# Patient Record
Sex: MALE | URBAN - METROPOLITAN AREA
[De-identification: names, ages, dates, MRNs, and addresses within clinical notes are randomized per-mention and may not be internally consistent; named-entity substitution may affect disease eponyms.]

---

## 2023-07-06 ENCOUNTER — WEB ENCOUNTER (OUTPATIENT)
Dept: URBAN - METROPOLITAN AREA CLINIC 63 | Facility: CLINIC | Age: 73
End: 2023-07-06

## 2023-07-10 ENCOUNTER — OFFICE VISIT (OUTPATIENT)
Dept: URBAN - METROPOLITAN AREA CLINIC 63 | Facility: CLINIC | Age: 73
End: 2023-07-10
Payer: OTHER GOVERNMENT

## 2023-07-10 VITALS
HEIGHT: 68 IN | TEMPERATURE: 97 F | BODY MASS INDEX: 27.89 KG/M2 | DIASTOLIC BLOOD PRESSURE: 80 MMHG | WEIGHT: 184 LBS | OXYGEN SATURATION: 96 % | SYSTOLIC BLOOD PRESSURE: 120 MMHG

## 2023-07-10 DIAGNOSIS — R93.89 ABNORMAL FINDING ON IMAGING: ICD-10-CM

## 2023-07-10 DIAGNOSIS — R10.13 EPIGASTRIC PAIN: ICD-10-CM

## 2023-07-10 PROBLEM — 408574004: Status: ACTIVE | Noted: 2023-07-10

## 2023-07-10 PROCEDURE — 99203 OFFICE O/P NEW LOW 30 MIN: CPT | Performed by: NURSE PRACTITIONER

## 2023-07-10 RX ORDER — KETOCONAZOLE 20 MG/ML
AS DIRECTED SHAMPOO, SUSPENSION TOPICAL
Status: ACTIVE | COMMUNITY

## 2023-07-10 RX ORDER — OMEPRAZOLE 40 MG/1
1 CAPSULE 30 MINUTES BEFORE MORNING MEAL CAPSULE, DELAYED RELEASE ORAL ONCE A DAY
Qty: 90 | Refills: 0 | OUTPATIENT
Start: 2023-07-10

## 2023-07-10 RX ORDER — FOLIC ACID 1 MG/1
1 TABLET TABLET ORAL ONCE A DAY
Status: ACTIVE | COMMUNITY

## 2023-07-10 RX ORDER — FLUOCINOLONE ACETONIDE 0.11 MG/ML
1 APPLICATION OIL TOPICAL TWICE A DAY
Status: ACTIVE | COMMUNITY

## 2023-07-10 RX ORDER — LISINOPRIL 20 MG/1
1 TABLET TABLET ORAL ONCE A DAY
Status: ACTIVE | COMMUNITY

## 2023-07-10 NOTE — HPI-TODAY'S VISIT:
He is seen today for chronic epigastric pain attacks which he has had for 8 years. with varying opinions.  He states having gallstones seen with previous imaging. He states the attacks cause moderate to severe pain which only last for 2-3 minutes and then it will resolve. Pain can be LUQ, RUQ and epigastric. Last work up was completed in [facility] CT scan which showed gallstones without evidence of cholecystitis and thickening of distal esophagus with EGD recommended.   He relates that eating will cause worsening GERD but does not trigger the pain. Bending over to  golf ball or when getting into the golfcart. He states when pain is bad it can cause some dyspnea because it hurts to take a breath. He has no neck or arm pain. He states in the past two weeks he has had 5-6 such attacks. He had previous Cardiac work up which he reports was normal  a few years ago due to same symptoms. He reports he had colonoscopy 10-15 years ago. He completed cologuard test about a year ago and it was negative. He does not want to have a colonoscopy.

## 2023-07-10 NOTE — PHYSICAL EXAM GASTROINTESTINAL
Abdomen , soft, EPIGASTRIC TENDERNESS nondistended , no guarding or rigidity , no masses palpable , normal bowel sounds, soft, nontender, nondistended

## 2023-07-21 LAB
A/G RATIO: 2
ABSOLUTE BASOPHILS: 12
ABSOLUTE EOSINOPHILS: 87
ABSOLUTE LYMPHOCYTES: 1085
ABSOLUTE MONOCYTES: 564
ABSOLUTE NEUTROPHILS: 4452
ALBUMIN: 4.6
ALKALINE PHOSPHATASE: 54
ALT (SGPT): 15
AMYLASE: 29
AST (SGOT): 13
BASOPHILS: 0.2
BILIRUBIN, TOTAL: 0.5
BUN/CREATININE RATIO: (no result)
BUN: 23
CALCIUM: 9.6
CARBON DIOXIDE, TOTAL: 29
CHLORIDE: 106
CREATININE: 1.24
EGFR: 62
EOSINOPHILS: 1.4
GLOBULIN, TOTAL: 2.3
GLUCOSE: 90
HEMATOCRIT: 38
HEMOGLOBIN: 12.8
LIPASE: 25
LYMPHOCYTES: 17.5
MCH: 30.3
MCHC: 33.7
MCV: 89.8
MONOCYTES: 9.1
MPV: 10.8
NEUTROPHILS: 71.8
PLATELET COUNT: 137
POTASSIUM: 4.3
PROTEIN, TOTAL: 6.9
RDW: 13.5
RED BLOOD CELL COUNT: 4.23
SED RATE BY MODIFIED: 6
SODIUM: 142
WHITE BLOOD CELL COUNT: 6.2

## 2023-08-03 ENCOUNTER — TELEPHONE ENCOUNTER (OUTPATIENT)
Dept: URBAN - METROPOLITAN AREA CLINIC 7 | Facility: CLINIC | Age: 73
End: 2023-08-03

## 2023-08-09 ENCOUNTER — OUT OF OFFICE VISIT (OUTPATIENT)
Dept: URBAN - METROPOLITAN AREA SURGERY CENTER 4 | Facility: SURGERY CENTER | Age: 73
End: 2023-08-09
Payer: OTHER GOVERNMENT

## 2023-08-09 ENCOUNTER — CLAIMS CREATED FROM THE CLAIM WINDOW (OUTPATIENT)
Dept: URBAN - METROPOLITAN AREA CLINIC 61 | Facility: CLINIC | Age: 73
End: 2023-08-09
Payer: OTHER GOVERNMENT

## 2023-08-09 DIAGNOSIS — R12 HEARTBURN: ICD-10-CM

## 2023-08-09 DIAGNOSIS — K44.9 HIATAL HERNIA: ICD-10-CM

## 2023-08-09 DIAGNOSIS — K44.9 DIAPHRAGMATIC HERNIA: ICD-10-CM

## 2023-08-09 DIAGNOSIS — R93.3 ABN FINDINGS-GI TRACT: ICD-10-CM

## 2023-08-09 DIAGNOSIS — K29.70 GASTRITIS: ICD-10-CM

## 2023-08-09 PROCEDURE — 43239 EGD BIOPSY SINGLE/MULTIPLE: CPT | Performed by: INTERNAL MEDICINE

## 2023-08-09 PROCEDURE — 88312 SPECIAL STAINS GROUP 1: CPT | Performed by: INTERNAL MEDICINE

## 2023-08-09 PROCEDURE — 00731 ANES UPR GI NDSC PX NOS: CPT | Performed by: NURSE ANESTHETIST, CERTIFIED REGISTERED

## 2023-08-09 RX ORDER — FLUOCINOLONE ACETONIDE 0.11 MG/ML
1 APPLICATION OIL TOPICAL TWICE A DAY
Status: ACTIVE | COMMUNITY

## 2023-08-09 RX ORDER — KETOCONAZOLE 20 MG/ML
AS DIRECTED SHAMPOO, SUSPENSION TOPICAL
Status: ACTIVE | COMMUNITY

## 2023-08-09 RX ORDER — LISINOPRIL 20 MG/1
1 TABLET TABLET ORAL ONCE A DAY
Status: ACTIVE | COMMUNITY

## 2023-08-09 RX ORDER — FOLIC ACID 1 MG/1
1 TABLET TABLET ORAL ONCE A DAY
Status: ACTIVE | COMMUNITY

## 2023-08-09 RX ORDER — OMEPRAZOLE 40 MG/1
1 CAPSULE 30 MINUTES BEFORE MORNING MEAL CAPSULE, DELAYED RELEASE ORAL ONCE A DAY
Qty: 90 | Refills: 0 | Status: ACTIVE | COMMUNITY
Start: 2023-07-10

## 2023-08-10 ENCOUNTER — OUT OF OFFICE VISIT (OUTPATIENT)
Dept: URBAN - METROPOLITAN AREA SURGERY CENTER 4 | Facility: SURGERY CENTER | Age: 73
End: 2023-08-10

## 2023-08-11 ENCOUNTER — WEB ENCOUNTER (OUTPATIENT)
Dept: URBAN - METROPOLITAN AREA CLINIC 63 | Facility: CLINIC | Age: 73
End: 2023-08-11

## 2023-08-31 ENCOUNTER — CLAIMS CREATED FROM THE CLAIM WINDOW (OUTPATIENT)
Dept: URBAN - METROPOLITAN AREA CLINIC 63 | Facility: CLINIC | Age: 73
End: 2023-08-31
Payer: OTHER GOVERNMENT

## 2023-08-31 ENCOUNTER — DASHBOARD ENCOUNTERS (OUTPATIENT)
Age: 73
End: 2023-08-31

## 2023-08-31 VITALS
SYSTOLIC BLOOD PRESSURE: 126 MMHG | OXYGEN SATURATION: 95 % | HEART RATE: 86 BPM | BODY MASS INDEX: 28.04 KG/M2 | WEIGHT: 185 LBS | DIASTOLIC BLOOD PRESSURE: 80 MMHG | TEMPERATURE: 96.5 F | HEIGHT: 68 IN

## 2023-08-31 DIAGNOSIS — R10.13 DYSPEPSIA: ICD-10-CM

## 2023-08-31 PROCEDURE — 99213 OFFICE O/P EST LOW 20 MIN: CPT | Performed by: INTERNAL MEDICINE

## 2023-08-31 RX ORDER — OMEPRAZOLE 40 MG/1
1 CAPSULE 30 MINUTES BEFORE MORNING MEAL CAPSULE, DELAYED RELEASE ORAL ONCE A DAY
Qty: 90 | Refills: 0 | Status: ACTIVE | COMMUNITY
Start: 2023-07-10

## 2023-08-31 RX ORDER — LISINOPRIL 20 MG/1
1 TABLET TABLET ORAL ONCE A DAY
Status: ACTIVE | COMMUNITY

## 2023-08-31 RX ORDER — FLUOCINOLONE ACETONIDE 0.11 MG/ML
1 APPLICATION OIL TOPICAL TWICE A DAY
Status: ACTIVE | COMMUNITY

## 2023-08-31 RX ORDER — KETOCONAZOLE 20 MG/ML
AS DIRECTED SHAMPOO, SUSPENSION TOPICAL
Status: ACTIVE | COMMUNITY

## 2023-08-31 RX ORDER — FOLIC ACID 1 MG/1
1 TABLET TABLET ORAL ONCE A DAY
Status: ACTIVE | COMMUNITY

## 2023-08-31 NOTE — HPI-TODAY'S VISIT:
Patient recently had an EGD which showed only some mild gastritis and biopsies were negative for Helicobacter.  Patient states that most of his discomfort is in the epigastric area and is worse when he is bending over to tie his shoes or if he twists or moves his thoracic spine.  It is not exacerbated by anything that he eats and is not related to food.  Patient did have a HIDA scan which showed a 23% ejection fraction.  He does state that before he was occasionally getting some discomfort in the right upper quadrant to the right back but he is not having that anymore